# Patient Record
Sex: MALE | Race: WHITE | Employment: FULL TIME | URBAN - METROPOLITAN AREA
[De-identification: names, ages, dates, MRNs, and addresses within clinical notes are randomized per-mention and may not be internally consistent; named-entity substitution may affect disease eponyms.]

---

## 2017-08-10 ENCOUNTER — HOSPITAL ENCOUNTER (OUTPATIENT)
Dept: CT IMAGING | Age: 37
Discharge: HOME | End: 2017-08-10
Attending: SURGERY
Payer: COMMERCIAL

## 2017-08-10 ENCOUNTER — HOSPITAL ENCOUNTER (OUTPATIENT)
Dept: SURGERY | Age: 37
Discharge: HOME | End: 2017-08-10

## 2017-08-10 VITALS — WEIGHT: 210 LBS | HEIGHT: 72 IN | BODY MASS INDEX: 28.44 KG/M2

## 2017-08-10 DIAGNOSIS — R10.9 ABDOMINAL PAIN, UNSPECIFIED LOCATION: ICD-10-CM

## 2017-08-10 PROCEDURE — 74011636320 HC RX REV CODE- 636/320: Performed by: SURGERY

## 2017-08-10 PROCEDURE — 74177 CT ABD & PELVIS W/CONTRAST: CPT

## 2017-08-10 PROCEDURE — 74011000258 HC RX REV CODE- 258: Performed by: SURGERY

## 2017-08-10 RX ORDER — SODIUM CHLORIDE 0.9 % (FLUSH) 0.9 %
10 SYRINGE (ML) INJECTION
Status: COMPLETED | OUTPATIENT
Start: 2017-08-10 | End: 2017-08-10

## 2017-08-10 RX ADMIN — IOPAMIDOL 100 ML: 755 INJECTION, SOLUTION INTRAVENOUS at 14:11

## 2017-08-10 RX ADMIN — SODIUM CHLORIDE 100 ML: 900 INJECTION, SOLUTION INTRAVENOUS at 14:11

## 2017-08-10 RX ADMIN — Medication 10 ML: at 14:11

## 2017-08-10 RX ADMIN — DIATRIZOATE MEGLUMINE AND DIATRIZOATE SODIUM 15 ML: 660; 100 LIQUID ORAL; RECTAL at 14:11

## 2017-08-10 NOTE — PERIOP NOTES
Patient verified name, , and procedure. Type: 1a; abbreviated assessment per anesthesia guidelines  Labs per surgeon: no orders in e EMR yet  Labs per anesthesia: none    Instructed pt that they will be notified via office/GI LAB for time of arrival to GI lab. Follow diet and prep instructions per office. May have clear liquids until 4 hours prior to time of arrival.     Foristell Roxo or shower the night before and the am of surgery with antibacterial soap. No lotions, oils, powders, cologne on skin. No make up, eye make up or jewelry. Wear loose fitting comfortable, clean clothing. Must have adult present in building the entire time . Medications for the day of procedure zoloft, patient to hold - none    The following discharge instructions reviewed with patient: medication given during procedure may cause drowsiness for several hours, therefore, do not drive or operate machinery for remainder of the day. You may not drink alcohol on the day of your procedure, please resume regular diet and activity unless otherwise directed. You may experience abdominal distention for several hours that is relieved by the passage of gas. Contact your physician if you have any of the following: fever or chills, severe abdominal pain or excessive amount of bleeding or a large amount when having a bowel movement.  Occasional specks of blood with bowel movement would not be unusual.

## 2017-08-16 ENCOUNTER — ANESTHESIA EVENT (OUTPATIENT)
Dept: ENDOSCOPY | Age: 37
End: 2017-08-16
Payer: COMMERCIAL

## 2017-08-17 ENCOUNTER — ANESTHESIA (OUTPATIENT)
Dept: ENDOSCOPY | Age: 37
End: 2017-08-17
Payer: COMMERCIAL

## 2017-08-17 ENCOUNTER — HOSPITAL ENCOUNTER (OUTPATIENT)
Age: 37
Setting detail: OUTPATIENT SURGERY
Discharge: HOME | End: 2017-08-17
Attending: SURGERY | Admitting: SURGERY
Payer: COMMERCIAL

## 2017-08-17 VITALS
DIASTOLIC BLOOD PRESSURE: 74 MMHG | OXYGEN SATURATION: 96 % | SYSTOLIC BLOOD PRESSURE: 106 MMHG | TEMPERATURE: 98.6 F | HEART RATE: 16 BPM | RESPIRATION RATE: 14 BRPM

## 2017-08-17 PROCEDURE — 74011250636 HC RX REV CODE- 250/636: Performed by: ANESTHESIOLOGY

## 2017-08-17 PROCEDURE — 76040000025: Performed by: SURGERY

## 2017-08-17 PROCEDURE — 76060000032 HC ANESTHESIA 0.5 TO 1 HR: Performed by: SURGERY

## 2017-08-17 PROCEDURE — 77030009426 HC FCPS BIOP ENDOSC BSC -B: Performed by: SURGERY

## 2017-08-17 PROCEDURE — 88312 SPECIAL STAINS GROUP 1: CPT | Performed by: SURGERY

## 2017-08-17 PROCEDURE — 87081 CULTURE SCREEN ONLY: CPT | Performed by: SURGERY

## 2017-08-17 PROCEDURE — 88305 TISSUE EXAM BY PATHOLOGIST: CPT | Performed by: SURGERY

## 2017-08-17 PROCEDURE — 74011250636 HC RX REV CODE- 250/636

## 2017-08-17 PROCEDURE — 74011000250 HC RX REV CODE- 250

## 2017-08-17 RX ORDER — PROPOFOL 10 MG/ML
INJECTION, EMULSION INTRAVENOUS AS NEEDED
Status: DISCONTINUED | OUTPATIENT
Start: 2017-08-17 | End: 2017-08-17 | Stop reason: HOSPADM

## 2017-08-17 RX ORDER — PROPOFOL 10 MG/ML
INJECTION, EMULSION INTRAVENOUS
Status: DISCONTINUED | OUTPATIENT
Start: 2017-08-17 | End: 2017-08-17 | Stop reason: HOSPADM

## 2017-08-17 RX ORDER — SODIUM CHLORIDE 9 MG/ML
25 INJECTION, SOLUTION INTRAVENOUS CONTINUOUS
Status: DISCONTINUED | OUTPATIENT
Start: 2017-08-17 | End: 2017-08-17 | Stop reason: HOSPADM

## 2017-08-17 RX ORDER — SODIUM CHLORIDE, SODIUM LACTATE, POTASSIUM CHLORIDE, CALCIUM CHLORIDE 600; 310; 30; 20 MG/100ML; MG/100ML; MG/100ML; MG/100ML
75 INJECTION, SOLUTION INTRAVENOUS CONTINUOUS
Status: DISCONTINUED | OUTPATIENT
Start: 2017-08-17 | End: 2017-08-17 | Stop reason: HOSPADM

## 2017-08-17 RX ADMIN — PROPOFOL 50 MG: 10 INJECTION, EMULSION INTRAVENOUS at 12:00

## 2017-08-17 RX ADMIN — PROPOFOL 160 MCG/KG/MIN: 10 INJECTION, EMULSION INTRAVENOUS at 12:00

## 2017-08-17 RX ADMIN — PROPOFOL 50 MG: 10 INJECTION, EMULSION INTRAVENOUS at 12:02

## 2017-08-17 RX ADMIN — SODIUM CHLORIDE, SODIUM LACTATE, POTASSIUM CHLORIDE, AND CALCIUM CHLORIDE: 600; 310; 30; 20 INJECTION, SOLUTION INTRAVENOUS at 11:39

## 2017-08-17 NOTE — H&P (VIEW-ONLY)
Rian Asencio MD  [address] [facility]  [place] [address] [address], [place]  [phone]    Annmarie Valles  MRN: [id number]    PCP: Malvin Landeros MD    HISTORY OF PRESENT ILLNESS:  Annmarie Valles is a 42y.o. year old male sent to me in consultation from Malvin Landeros MD  to discuss multiple symptoms which include occasional nausea but no vomiting. He recently had severe left lower quadrant abdominal pain that was 8/10. He persisted through it and it has not returned to the same extend but seems to persist.  He denied any significant fevers or chills, some loose stools and no constipation. He denies chest pain or shortness of breath. His stool caliber or firmness has not been affected. He says this has not been a chronic problem and only started in the last few months. He has tried OTC medications and dietary changes to have any immediate effect. He doesn't know what may have caused it and has not noted any dietary or bowel movement related issues. He says only time seems to let it calm down. He also has noticed on at least 2 separate occasions bright red blood in his stool and on the toilet paper. He also describes black stool on a few occasions as well. He is sent for evaluation of the bowel habit changes. I am concerned about the location of the pain as well for diverticultitis. The patient has never had a colonoscopy before. No family history of colon/rectal cancer. No family history of colon/rectal polyps. There is no history of HNPCC associated cancers. No perianal itching/burning, protrusion/swelling, or rectal discharge. No incontinence of gas/liquid/solid. Has blood in his bowel movements. Stool consistency is soft. No straining for BMs. Dietary fiber only. No fiber supplements. No stool softeners, laxatives, or enemas. No pain with BM.       REVIEW OF SYSTEMS:  Constitutional: No fevers/chills, no weakness, no fatigue, no lightheadedness, no night sweats, no insomnia, no appetite changes, no weight changes, no memory problems. Eyes: No changes in vision, no diplopia, no tearing, no scotomata, no pain   Ears/Nose/Throat/Mouth:  No change in hearing, no tinnitus, no bleeding, no epistaxis, no nasal discharge, no sinusitis. Respiratory: No cough, no dyspnea, no wheezing, no hemoptysis, no sleep apnea   Cardiovascular: No chest pains, no chest pressure, no chest tightness, no palpitations   Gastrointestinal: No abdominal pains, no bowel habit changes, no nausea, no emesis, no hematochezia, no melena, no cramping, no constipation, no diarrhea, no incontinence, no jaundice, no diverticulosis. Otherwise per HPI   Genitourinary: No dysuria, no hematuria, no urinary frequency, no nocturia, no recent UTIs   Musculoskeletal: No back/neck pain, no arthritis, no joint pain/swelling, no muscle pains   Skin: No rashes, no itching, no ulcers   Hematologic:  No easy bruising, no anemia   Neurologic: No headaches, no dizziness, no seizures   Psychiatric: No depressive symptoms, no anxiety symptoms, no changes in mood, no substance abuse     PAST MEDICAL HISTORY:     Past Medical History:   Diagnosis Date    Bloody stool     Colitis, Clostridium difficile     Depression     GERD (gastroesophageal reflux disease)     Panic attacks     Prostatitis     Prostatitis     Tobacco abuse        PAST SURGICAL HISTORY:     Past Surgical History:   Procedure Laterality Date    HX RHINOPLASTY  1997       ALLERGIES: No Known Allergies    HOME MEDICATIONS:   Current Outpatient Prescriptions   Medication Sig    sertraline (ZOLOFT) 100 mg tablet TAKE 1 & 1/2 TABLETS BY MOUTH EVERY DAY    LORazepam (ATIVAN) 2 mg tablet Take  by mouth every six (6) hours as needed for Anxiety.  polyethylene glycol (MIRALAX) 17 gram packet Take 1 Packet by mouth daily as needed. No current facility-administered medications for this visit. SOCIAL HISTORY:    Social History     Social History    Marital status:      Spouse name: N/A    Number of children: N/A    Years of education: N/A     Occupational History    Not on file. Social History Main Topics    Smoking status: Current Every Day Smoker     Packs/day: 1.00    Smokeless tobacco: Never Used    Alcohol use No    Drug use: Not on file    Sexual activity: Not on file     Other Topics Concern    Not on file     Social History Narrative       Family History   Problem Relation Age of Onset    Heart Disease Mother     Hypertension Mother     Alcohol abuse Father     Cancer Maternal Grandmother      Lung    Diabetes Maternal Grandfather     Stroke Maternal Grandfather     Cancer Maternal Grandfather      Lung    Cancer Paternal Grandmother      Ovarian       PHYSICAL EXAM:  Blood pressure 116/73, pulse 77, height 6' (1.829 m), weight 210 lb (95.3 kg). Body mass index is 28.48 kg/(m^2). All female patients are examined in the presence of my Nurse or a medical assistant and at the request of male patients. General: Normotensive, no acute distress, well developed, well nourished appearing   Head:  AT/NC, no lesions   Eyes:  PERRLA, EOM's full, conjunctivae clear   Neck:  Supple, no masses, no lymphadenopathy, no thyromegaly, no carotid bruits   Chest:  Lungs clear, no rales, no rhonchi, no wheezes   Heart:  RRR, no rubs, no gallops   Abdomen:  Soft, no tenderness, no rebound, no guarding, no masses, nondistended. Rectal:  deferred   Back:  Normal curvature, no tenderness. Negative Erwin's punch. Extremities:  FROM, no deformities, no edema, no erythema   Neuro:  Physiologic, oriented x3, full affect, no localizing findings   Skin:  No rash              ASSESSMENT:  Abdominal pain concerning for diverticulitis. I will start with a CT scan of the abdomen and pelvis with IV and oral contrast.  If this is negative then he will need an EGD and colonoscopy.   I have explained the reasoning to the patient and he agrees. He would like to proceed to try to find out why he is symptomatic. He is average risk by family history but high risk to recent GI bleed symptoms for lower GI pathology. As well as risk factors related to family history and current symptoms. Were discussed. PLAN:    I will order a CT scan first and then proceed to endoscopy if normal  The patient was counseled on the risks and benefits of colonoscopy. Major risks include bleeding (1/1000), perforation (1/1000), missed lesions, and reactions to sedation medications or bowel prep. I explained the risks and benefits of colonoscopy. - All questions were answered to the patient's satisfaction. The colonoscopy will be scheduled at the patients next convenient time. The patient will follow up with me to discuss any pathology. If it is benign the patient is given the option to receive a phone call instead of the visit. Fax communication sent to Catalina Johnson MD      Greater than 50% of this 45 minute visit was spent on counseling, discussing the abdominal pain and differential diagnosis and how each is evaluated and treated as well as the rationale behind the EGD and colon cancer screening, the colonoscopy procedure, risks, benefits, and alternatives. Rian Walton MD  8/1/2017

## 2017-08-17 NOTE — DISCHARGE INSTRUCTIONS
Gastrointestinal Colonoscopy/Flexible Sigmoidoscopy  Lower Exam Discharge Instructions    1. Call your doctor for any problems or questions. 2. Contact the doctors office for follow up appointment as directed  3. Medication may cause drowsiness for several hours, therefore, do not drive or operate machinery for remainder of the day. 4. No alcohol today. 5. Ordinarily, you may resume regular diet and activity after exam unless otherwise specified by your physician. 6. Because of air put into your colon during exam, you may experience some abdominal distension, relieved by the passage of gas, for several hours. 7. Contact your physician if you have any of the following:  a. Excessive amount of bleeding - large amount when having a bowel movement. Occasional specks of blood with bowel movement would not be unusual.  b. Severe abdominal pain  c. Fever or Chills  8. Polyp Removal - follow these additional instructions  a. Clear liquid diet for the next meal (jell-o, broth, clear drinks)  b. Soft diet for 24 hours, then resume regular diet   c. Take Metamucil - 1 tablespoon in juice every morning for 3 days  d. No Aspirin, Advil, Aleve, Nuprin, Ibuprofen, or medications that contain these drugs for 2 weeks. Gastrointestinal Esophagogastroduodenoscopy (EGD)  Upper Exam Discharge Instructions    1. Call your doctor for any problems or questions. 2. Contact the doctor's office for follow up appointment as directed. 3. Medication may cause drowsiness for several hours, therefore, do not drive or                  operate machinery for remainder of the day. 4. No alcohol today. 5. Ordinarily, you may resume regular diet and activity after exam unless otherwise              specified by your physician. 6. For mild soreness in your throat you may use Cepacol throat lozenges or warm               salt-water gargles as needed.

## 2017-08-17 NOTE — ANESTHESIA PREPROCEDURE EVALUATION
Anesthetic History   No history of anesthetic complications            Review of Systems / Medical History  Patient summary reviewed, nursing notes reviewed and pertinent labs reviewed    Pulmonary          Smoker         Neuro/Psych         Psychiatric history     Cardiovascular  Within defined limits                Exercise tolerance: >4 METS     GI/Hepatic/Renal  Within defined limits              Endo/Other  Within defined limits           Other Findings            Physical Exam    Airway  Mallampati: I    Neck ROM: normal range of motion   Mouth opening: Normal     Cardiovascular  Regular rate and rhythm,  S1 and S2 normal,  no murmur, click, rub, or gallop             Dental  No notable dental hx       Pulmonary  Breath sounds clear to auscultation               Abdominal         Other Findings            Anesthetic Plan    ASA: 2  Anesthesia type: total IV anesthesia          Induction: Intravenous  Anesthetic plan and risks discussed with: Patient and Spouse      Discussed TIVA with its benefits (lower risk of nausea and sore throat, etc.) and risks including possible awareness, patient understands and elects to proceed

## 2017-08-17 NOTE — LETTER
Rian Cabrera M.D. Bariatric and General Surgeon Endoscopy and Director of Robotic Surgery [facility] [place] [address], [address] [place], [address]  Phone: [phone] Fax: [phone] Date: 2017 Name: Ayala Clark MRN: [id number] : 1980 Age: 42 y.o. Sex: male NILSA Felton operated on the above patient and performed a PROCEDURE: 
Esophagogastroduodenoscopy 2 cold antral biopsies were obtained. Procedure in Detail: 
Post-operative Diagnosis:   normal colonoscopy Colonoscopy, Digital Rectal Examination, Anoscopy performed.  A Terminal Ileoscopy was not performed Sincerely, 
 
Chrissie Hendricks MD

## 2017-08-17 NOTE — OP NOTES
Esophagogastroduodenoscopy Procedure Note        Patient: Anthony Bui MRN: [id number]  SSN: [id number]    YOB: 1973  Age: 53 y.o. Sex: male        PROCEDURE:  Esophagogastroduodenoscopy 2 cold antral biopsies were obtained. Procedure in Detail:  Informed consent was obtained for the procedure, the patient was brought to the endoscopy suite and sedation was induced by anesthesia. The [id number] gastroscope was inserted into the mouth and advanced under direct vision to second portion of the duodenum. A careful inspection was made as the gastroscope was withdrawn, including a retroflexed view of the proximal stomach; findings and interventions are described below, with appropriate photodocumentation obtained. 2 biopsies of the antrum were obtained and sent for pathology. A SIXTO test was obtained.       Findings:   OROPHARYNX: Cords and pyriform recesses normal.   ESOPHAGUS: The esophagus is normal. The proximal, mid, and distal portions are normal. The Z-Line is intact. STOMACH: The fundus on antegrade and retroflex view showed a small hiatal hernia. The body, antrum, and pylorus are normal.   DUODENUM: The bulb and second portions are normal.        Therapies:    no mucosal lesion appreciated     EBL-  minimal     Specimens: Specimens were collected and sent to pathology.      Complications:   None; patient tolerated the procedure well.      Recommendations:  - Await pathology. - Await SIXTO test result and treat for Helicobacter pylori if positive. - Follow up with me.        Colonoscopy Procedure Note  [facility]  [id number]  1980   Indications:    Abdominal pain, LLQ      Pre-operative Diagnosis:   Abdominal pain, LLQ     Post-operative Diagnosis:   normal colonoscopy  Colonoscopy, Digital Rectal Examination, Anoscopy performed.   A Terminal Ileoscopy was not performed      Surgeon: Andrea Corbin MD     Referring Provider:             Ricki Lafleur MD     Sedation:  MAC anesthesia Propofol     Pre-Procedure Exam:  Airway: clear   Heart: normal S1and S2    Lungs: clear bilateral  Abdomen: soft, nontender, bowel sounds present and normal in all quadrants   Mental Status: awake, alert, and oriented to person, place, and time     Procedure in Detail:  Informed consent was obtained for the procedure after delineating the risks, benefits, and alternatives to the procedure. Specific risks of perforation (1/1000), hemorrhage (1/1000), missed lesions, adverse drug reaction, and aspiration were discussed. The patient was placed in the left lateral decubitus position. Based on the pre-procedure assessment, including review of the patient's medical history, medications, and allergies, moderate sedation was felt to be appropriate. The aforementioned medications were given in an incremental fashion to achieve adequate sedation. The patient was monitored continuously with ECG tracing, pulse oximetry, blood pressure monitoring, and direct observations.      A rectal examination was performed and normal. The Olympus videocolonoscope [id number] was inserted per anus and advanced under direct vision to the cecum, which was identified by the ileocecal valve and appendiceal orifice. The quality of the colonic preparation was good. The colonoscope was slowly withdrawn using a to-and-fro and circumferential motion over 10 minutes with careful examination between folds. Retroflexion was performed in the rectum. Appropriate photodocumentation was obtained.      Findings:   Rectum: mild internal hemorrhoids. No polyps, masses, bleeding, or mucosal abnormalities. Sigmoid: no incidental diverticulosis. No polyps, masses, bleeding, or mucosal abnormalities. Descending Colon: Normal.  No polyps, masses, bleeding, or mucosal abnormalities. Transverse Colon: Normal. No polyps, masses, bleeding, or mucosal abnormalities. Ascending Colon: Normal.  No polyps, masses, bleeding, or mucosal abnormalities.   Cecum: Normal.  No polyps, masses, bleeding, or mucosal abnormalities. Terminal Ileum: no mucosal lesion appreciated     Therapies:  no mucosal lesion appreciated     Implants: None     Specimen:  none     Complications: None; patient tolerated the procedure well.     EBL:  minimal     Recommendations:   -Follow up with primary care physician.  -Follow up in the office as scheduled. -Follow up with Dr Angela Vides. No colon pathology. No banding.   No obvious hemorrhoids at this time.      Chrissie Hendricks MD  8/17/2017  12:20 PM

## 2017-08-17 NOTE — ANESTHESIA POSTPROCEDURE EVALUATION
Post-Anesthesia Evaluation and Assessment    Patient: Sarah Hercules MRN: [id number]  SSN: [id number]    YOB: 1980  Age: 42 y.o. Sex: male       Cardiovascular Function/Vital Signs  Visit Vitals    BP 96/65    Pulse 65    Temp 37 °C (98.6 °F)    Resp 14    SpO2 93%       Patient is status post total IV anesthesia anesthesia for Procedure(s):  ESOPHAGOGASTRODUODENOSCOPY (EGD)  ESOPHAGOGASTRODUODENAL (EGD) BIOPSY  COLONOSCOPY. Nausea/Vomiting: None    Postoperative hydration reviewed and adequate. Pain:  Pain Scale 1: Numeric (0 - 10) (08/17/17 1238)  Pain Intensity 1: 0 (08/17/17 1238)   Managed    Neurological Status: At baseline    Mental Status and Level of Consciousness: Alert and oriented     Pulmonary Status:   O2 Device: Room air (08/17/17 1238)   Adequate oxygenation and airway patent    Complications related to anesthesia: None    Post-anesthesia assessment completed.  No concerns    Signed By: Rita Isaacs MD     August 17, 2017

## 2017-08-17 NOTE — IP AVS SNAPSHOT
Arlyss Drought 
 
 
 [address] [place] [address] 
[phone] Patient: Angela Ag MRN: [id number] :1980 You are allergic to the following No active allergies Recent Documentation Smoking Status Current Every Day Smoker Emergency Contacts Name Discharge Info Relation Home Work Mobile Chelsi Foote DISCHARGE CAREGIVER [3] Mother [14]   [phone] About your hospitalization You were admitted on:  2017 You last received care in the:  [facility] ENDOSCOPY You were discharged on:  2017 Unit phone number:  [phone] Why you were hospitalized Your primary diagnosis was:  Not on File Providers Seen During Your Hospitalizations Provider Role Specialty Primary office phone Emerald Cummings MD Attending Provider General Surgery [phone] Your Primary Care Physician (PCP) Primary Care Physician Office Phone Office Fax Topher Deleon [phone] Follow-up Information Follow up With Details Comments Contact Info Emerald Cummings MD   [address] [place] [address] 
[phone] Current Discharge Medication List  
  
ASK your doctor about these medications Dose & Instructions Dispensing Information Comments Morning Noon Evening Bedtime LORazepam 2 mg tablet Commonly known as:  ATIVAN Your last dose was: Your next dose is: Take  by mouth every six (6) hours as needed for Anxiety. Refills:  0  
     
   
   
   
  
 polyethylene glycol 17 gram packet Commonly known as:  Marcellina Dull Your last dose was: Your next dose is:    
   
   
 Dose:  17 g Take 1 Packet by mouth daily as needed. Quantity:  30 Packet Refills:  3  
     
   
   
   
  
 sertraline 100 mg tablet Commonly known as:  ZOLOFT  
   
 Your last dose was: Your next dose is: TAKE 1 & 1/2 TABLETS BY MOUTH EVERY DAY. Take / use AM day of surgery  per anesthesia protocols. Refills:  1 Discharge Instructions Gastrointestinal Colonoscopy/Flexible Sigmoidoscopy Lower Exam Discharge Instructions 1. Call your doctor for any problems or questions. 2. Contact the doctors office for follow up appointment as directed 3. Medication may cause drowsiness for several hours, therefore, do not drive or operate machinery for remainder of the day. 4. No alcohol today. 5. Ordinarily, you may resume regular diet and activity after exam unless otherwise specified by your physician. 6. Because of air put into your colon during exam, you may experience some abdominal distension, relieved by the passage of gas, for several hours. 7. Contact your physician if you have any of the following: 
a. Excessive amount of bleeding  large amount when having a bowel movement. Occasional specks of blood with bowel movement would not be unusual. 
b. Severe abdominal pain 
c. Fever or Chills 8. Polyp Removal  follow these additional instructions 
a. Clear liquid diet for the next meal (jell-o, broth, clear drinks) b. Soft diet for 24 hours, then resume regular diet  
c. Take Metamucil  1 tablespoon in juice every morning for 3 days 
d. No Aspirin, Advil, Aleve, Nuprin, Ibuprofen, or medications that contain these drugs for 2 weeks. Gastrointestinal Esophagogastroduodenoscopy (EGD) Upper Exam Discharge Instructions 1. Call your doctor for any problems or questions. 2. Contact the doctor's office for follow up appointment as directed. 3. Medication may cause drowsiness for several hours, therefore, do not drive or                  operate machinery for remainder of the day. 4. No alcohol today.  
5. Ordinarily, you may resume regular diet and activity after exam unless otherwise              specified by your physician. 6. For mild soreness in your throat you may use Cepacol throat lozenges or warm               salt-water gargles as needed. Discharge Orders None Introducing [facility]! Dear Deepika Hooker: Thank you for requesting a [employer] account. Our records indicate that you already have an active [employer] account. You can access your account anytime at [email or web address] Did you know that you can access your hospital and ER discharge instructions at any time in [employer]? You can also review all of your test results from your hospital stay or ER visit. Additional Information If you have questions, please visit the Frequently Asked Questions section of the [employer] website at [email or web address] Remember, [employer] is NOT to be used for urgent needs. For medical emergencies, dial 911. Now available from your iPhone and Android! General Information Please provide this summary of care documentation to your next provider. Patient Signature:  ____________________________________________________________ Date:  ____________________________________________________________  
  
Carl Kelly Provider Signature:  ____________________________________________________________ Date:  ____________________________________________________________

## 2017-08-17 NOTE — INTERVAL H&P NOTE
H&P Update:  Sarah Hercules was seen and examined. History and physical has been reviewed. The patient has been examined. There have been no significant clinical changes since the completion of the originally dated History and Physical.  He is ready for his EGD and colonoscopy. His CT scan was normal and did not show signs of diverticulitis. If he has hemorrhoids causing bleeding  I will plan to band them. He understands and agrees.     Signed By: Ace Barahona MD     August 17, 2017 11:43 AM

## 2017-08-18 LAB
H PYLORI AG TISS QL IMSTN: NEGATIVE
H PYLORI AG TISS QL IMSTN: NEGATIVE

## 2017-08-18 NOTE — PROGRESS NOTES
Addy Ibarra  Can you call him with the negative results. He has a colon polyp bx that I will call him with in 2 weeks.   Joe Farah MD

## 2022-06-23 ENCOUNTER — TELEPHONE (OUTPATIENT)
Dept: FAMILY MEDICINE CLINIC | Facility: CLINIC | Age: 42
End: 2022-06-23

## 2022-06-23 RX ORDER — OMEPRAZOLE 40 MG/1
40 CAPSULE, DELAYED RELEASE ORAL DAILY
Qty: 30 CAPSULE | Refills: 0 | Status: SHIPPED | OUTPATIENT
Start: 2022-06-23 | End: 2022-07-18

## 2022-06-23 RX ORDER — SERTRALINE HYDROCHLORIDE 100 MG/1
100 TABLET, FILM COATED ORAL DAILY
Qty: 30 TABLET | Refills: 0 | Status: SHIPPED | OUTPATIENT
Start: 2022-06-23

## 2022-06-23 NOTE — TELEPHONE ENCOUNTER
Omeprazole 40 mg and Sertraline 100 mg to be sent to [facility] on [id number] [facility] in [facility].

## 2022-06-23 NOTE — TELEPHONE ENCOUNTER
30 day supply ERx. However, office visit is required for further refills. Last VV was 6/10/21 and last OV was 4/17/19. Please notify and schedule.

## 2022-06-28 ENCOUNTER — TELEPHONE (OUTPATIENT)
Dept: FAMILY MEDICINE CLINIC | Facility: CLINIC | Age: 42
End: 2022-06-28

## 2022-07-18 RX ORDER — OMEPRAZOLE 40 MG/1
CAPSULE, DELAYED RELEASE ORAL
Qty: 30 CAPSULE | Refills: 0 | Status: SHIPPED | OUTPATIENT
Start: 2022-07-18

## 2022-07-22 RX ORDER — SERTRALINE HYDROCHLORIDE 100 MG/1
TABLET, FILM COATED ORAL
Qty: 30 TABLET | Refills: 0 | OUTPATIENT
Start: 2022-07-22

## 2022-08-12 RX ORDER — OMEPRAZOLE 40 MG/1
CAPSULE, DELAYED RELEASE ORAL
Qty: 30 CAPSULE | Refills: 0 | OUTPATIENT
Start: 2022-08-12
